# Patient Record
Sex: FEMALE | Race: WHITE | URBAN - METROPOLITAN AREA
[De-identification: names, ages, dates, MRNs, and addresses within clinical notes are randomized per-mention and may not be internally consistent; named-entity substitution may affect disease eponyms.]

---

## 2017-04-06 ENCOUNTER — OFFICE VISIT (OUTPATIENT)
Dept: FAMILY MEDICINE | Facility: CLINIC | Age: 22
End: 2017-04-06
Payer: COMMERCIAL

## 2017-04-06 VITALS
HEIGHT: 64 IN | WEIGHT: 189 LBS | HEART RATE: 100 BPM | BODY MASS INDEX: 32.27 KG/M2 | SYSTOLIC BLOOD PRESSURE: 112 MMHG | DIASTOLIC BLOOD PRESSURE: 64 MMHG | TEMPERATURE: 97.1 F | RESPIRATION RATE: 14 BRPM | OXYGEN SATURATION: 99 %

## 2017-04-06 DIAGNOSIS — F41.8 DEPRESSION WITH ANXIETY: Primary | ICD-10-CM

## 2017-04-06 PROCEDURE — 99213 OFFICE O/P EST LOW 20 MIN: CPT | Performed by: FAMILY MEDICINE

## 2017-04-06 RX ORDER — BUPROPION HYDROCHLORIDE 150 MG/1
150 TABLET ORAL EVERY MORNING
Qty: 30 TABLET | Refills: 1 | Status: SHIPPED | OUTPATIENT
Start: 2017-04-06 | End: 2018-05-25

## 2017-04-06 ASSESSMENT — ANXIETY QUESTIONNAIRES
2. NOT BEING ABLE TO STOP OR CONTROL WORRYING: SEVERAL DAYS
GAD7 TOTAL SCORE: 7
6. BECOMING EASILY ANNOYED OR IRRITABLE: NEARLY EVERY DAY
1. FEELING NERVOUS, ANXIOUS, OR ON EDGE: NOT AT ALL
IF YOU CHECKED OFF ANY PROBLEMS ON THIS QUESTIONNAIRE, HOW DIFFICULT HAVE THESE PROBLEMS MADE IT FOR YOU TO DO YOUR WORK, TAKE CARE OF THINGS AT HOME, OR GET ALONG WITH OTHER PEOPLE: SOMEWHAT DIFFICULT
5. BEING SO RESTLESS THAT IT IS HARD TO SIT STILL: SEVERAL DAYS
7. FEELING AFRAID AS IF SOMETHING AWFUL MIGHT HAPPEN: MORE THAN HALF THE DAYS
3. WORRYING TOO MUCH ABOUT DIFFERENT THINGS: NOT AT ALL

## 2017-04-06 ASSESSMENT — PAIN SCALES - GENERAL: PAINLEVEL: NO PAIN (0)

## 2017-04-06 ASSESSMENT — PATIENT HEALTH QUESTIONNAIRE - PHQ9: 5. POOR APPETITE OR OVEREATING: NOT AT ALL

## 2017-04-06 NOTE — NURSING NOTE
"Chief Complaint   Patient presents with     Recheck Medication     switch medication     Contraception     talk about it       Initial /64 (BP Location: Right arm, Patient Position: Chair, Cuff Size: Adult Regular)  Pulse 100  Temp 97.1  F (36.2  C) (Temporal)  Resp 14  Ht 5' 4\" (1.626 m)  Wt 189 lb (85.7 kg)  SpO2 99%  BMI 32.44 kg/m2 Estimated body mass index is 32.44 kg/(m^2) as calculated from the following:    Height as of this encounter: 5' 4\" (1.626 m).    Weight as of this encounter: 189 lb (85.7 kg).  Medication Reconciliation: complete   Ludivina Leigh MA 4/6/2017        "

## 2017-04-06 NOTE — PROGRESS NOTES
SUBJECTIVE:                                                    Roxi Michael is a 22 year old female who presents to clinic today for the following health issues:        Depression and Anxiety Follow-Up    Status since last visit: SAME     Other associated symptoms:None    Complicating factors:     Significant life event: No     Current substance abuse: None    PHQ-9 SCORE 10/3/2016 4/6/2017   Total Score 10 15     ASHLEY-7 SCORE 10/3/2016   Total Score 11        PHQ-9  English      PHQ-9   Any Language     GAD7       Amount of exercise or physical activity: None    Problems taking medications regularly: No    Medication side effects: none    Diet: regular (no restrictions)    Roxi is here today for follow up on depression and anxiety.  Has had these conditions for years.  Was doing well with Lexapro, but lately she hasn't felt it was working for her.  She took herself the for 3 weeks and has not felt the difference. Denies of depression or anxiety.  She noted having problem with concentration - not able to focus on any thinng. Get distracted easily. Trying to go back to school.  Never been diagnosed ADHD.  Stated that she barely passed the high school and college. She dropped out of college.  Used to snort cocaine, but has not done any drug for long time.  Also quit drinking about 2 weeks ago.  No suicidal. Used to feel anxious, but not much now.  Do not feel depressed.  No excessive stress.    Problem list and histories reviewed & adjusted, as indicated.  Additional history: as documented    Patient Active Problem List   Diagnosis     Illicit drug use     Depression with anxiety     Non morbid obesity due to excess calories     Past Surgical History:   Procedure Laterality Date     NO HISTORY OF SURGERY         Social History   Substance Use Topics     Smoking status: Former Smoker     Quit date: 1/5/2016     Smokeless tobacco: Never Used     Alcohol use 33.6 oz/week     56 Standard drinks or equivalent per  "week      Comment: daily - 7-8 drinks couple.       Family History   Problem Relation Age of Onset     DIABETES No family hx of      Coronary Artery Disease No family hx of      Hypertension No family hx of      Hyperlipidemia No family hx of      CEREBROVASCULAR DISEASE No family hx of      Breast Cancer No family hx of      Colon Cancer No family hx of      Prostate Cancer No family hx of      Other Cancer No family hx of          Current Outpatient Prescriptions   Medication Sig Dispense Refill     etonogestrel (IMPLANON/NEXPLANON) 68 MG IMPL 1 each (68 mg) by Subdermal route continuous  0     escitalopram (LEXAPRO) 20 MG tablet Take 1 tablet (20 mg) by mouth daily 90 tablet 1     Allergies   Allergen Reactions     No Known Drug Allergies      Nickel Itching and Rash       Reviewed and updated as needed this visit by clinical staff  Tobacco  Allergies  Meds  Med Hx  Surg Hx  Fam Hx  Soc Hx      Reviewed and updated as needed this visit by Provider         ROS:  Constitutional, HEENT, cardiovascular, pulmonary, gi and gu systems are negative, except as otherwise noted.    OBJECTIVE:                                                    /64 (BP Location: Right arm, Patient Position: Chair, Cuff Size: Adult Regular)  Pulse 100  Temp 97.1  F (36.2  C) (Temporal)  Resp 14  Ht 5' 4\" (1.626 m)  Wt 189 lb (85.7 kg)  SpO2 99%  BMI 32.44 kg/m2  Body mass index is 32.44 kg/(m^2).   GENERAL: healthy, alert and no distress  HENT: ear canals and TM's normal.  Nares are non-congested. No tender with palpation to the sinuses.  NECK: no adenopathy and thyroid normal to palpation  RESP: lungs clear to auscultation - no rales, rhonchi or wheezes  CV: regular rate and rhythm, no murmur.  NEURO: Normal strength and tone, mentation intact and speech normal.  Dress appropriately.  PSYCH: mentation appears normal, affect normal/bright. Thought is intact, no hallucination, suicidal or homicidal     Diagnostic Test " Results:  none      ASSESSMENT/PLAN:                                                        ICD-10-CM    1. Depression with anxiety F41.8 buPROPion (WELLBUTRIN XL) 150 MG 24 hr tablet     Roxi is known to have depression and anxiety and was doing well with Lexapro for years. She stopped the Lexapro 3 weeks ago because she didn't feel was affective. She is not having depression or anxiety despite being off the Lexapro for 3 weeks. She noted of having trouble with concentrating and get distracted easily. She has no history or being diagnosed with ADHD although she had trouble with passing the classes in high school and college years. She dropped out of college because of it. She is now trying to go back to school. She is concerned as he may have ADHD/ADD. She has a history of cocaine use but quit it for a while. She is also quit drinking about 2 weeks ago. She is trying to put her life together and wanted to go back to school. No active suicidal or homicidal ideation. No hallucination.     Discussed with her about treatment options. Refer her to psychologist to evaluate for possible ADD/ADHD.  In meantime will have her try Wellbutrin 150 mg daily. Side effects discussed. Follow-up in a month, earlier as needed. I also encouraged her to keep up the good work to stay away from drugs and alcohol.  I encouraged her to keep focus on her goal and work hard to achieve it.    Yogi Ferrell Mai, MD  [facility]

## 2017-04-06 NOTE — MR AVS SNAPSHOT
"              After Visit Summary   2017    Roxi Michael    MRN: [id number]           Patient Information     Date Of Birth          1995        Visit Information        Provider Department      2017 2:40 PM Yogi James MD [facility]        Today's Diagnoses     Depression with anxiety    -  1       Follow-ups after your visit        Follow-up notes from your care team     Return in about 1 month (around 2017).      Who to contact     If you have questions or need follow up information about today's clinic visit or your schedule please contact [facility] directly at [phone].  Normal or non-critical lab and imaging results will be communicated to you by Format [employer], letter or phone within 4 business days after the clinic has received the results. If you do not hear from us within 7 days, please contact the clinic through [employer] or phone. If you have a critical or abnormal lab result, we will notify you by phone as soon as possible.  Submit refill requests through [employer] or call your pharmacy and they will forward the refill request to us. Please allow 3 business days for your refill to be completed.          Additional Information About Your Visit        MyChart Information     [employer] lets you send messages to your doctor, view your test results, renew your prescriptions, schedule appointments and more. To sign up, go to [email or web address] . Click on \"Log in\" on the left side of the screen, which will take you to the Welcome page. Then click on \"Sign up Now\" on the right side of the page.     You will be asked to enter the access code listed below, as well as some personal information. Please follow the directions to create your username and password.     Your access code is: [email or web address]  Expires: 2017 10:14 PM     Your access code will  in 90 days. If you need help or a new code, please call your [facility] or [phone].        Care " "EveryWhere ID     This is your Care EveryWhere ID. This could be used by other organizations to access your [facility] medical records  [id number]        Your Vitals Were     Pulse Temperature Respirations Height Pulse Oximetry BMI (Body Mass Index)    100 97.1  F (36.2  C) (Temporal) 14 5' 4\" (1.626 m) 99% 32.44 kg/m2       Blood Pressure from Last 3 Encounters:   04/06/17 112/64   11/15/16 104/68   10/03/16 126/76    Weight from Last 3 Encounters:   04/06/17 189 lb (85.7 kg)   11/15/16 186 lb 8 oz (84.6 kg)   10/03/16 183 lb 6.4 oz (83.2 kg)              Today, you had the following     No orders found for display         Today's Medication Changes          These changes are accurate as of: 4/6/17 11:59 PM.  If you have any questions, ask your nurse or doctor.               Start taking these medicines.        Dose/Directions    buPROPion 150 MG 24 hr tablet   Commonly known as:  WELLBUTRIN XL   Used for:  Depression with anxiety   Started by:  Yogi James MD        Dose:  150 mg   Take 1 tablet (150 mg) by mouth every morning   Quantity:  30 tablet   Refills:  1            Where to get your medicines      These medications were sent to [facility] [place] [facility] [address]   [address] , [facility] [address]     Phone:  [phone]     buPROPion 150 MG 24 hr tablet                Primary Care Provider    None Specified       No primary provider on file.        Thank you!     Thank you for choosing [facility]  for your care. Our goal is always to provide you with excellent care. Hearing back from our patients is one way we can continue to improve our services. Please take a few minutes to complete the written survey that you may receive in the mail after your visit with us. Thank you!             Your Updated Medication List - Protect others around you: Learn how to safely use, store and throw away your medicines at www.disposemymeds.org.          This list is accurate " as of: 4/6/17 11:59 PM.  Always use your most recent med list.                   Brand Name Dispense Instructions for use    buPROPion 150 MG 24 hr tablet    WELLBUTRIN XL    30 tablet    Take 1 tablet (150 mg) by mouth every morning       etonogestrel 68 MG Impl    IMPLANON/NEXPLANON     1 each (68 mg) by Subdermal route continuous

## 2017-04-07 ASSESSMENT — ANXIETY QUESTIONNAIRES: GAD7 TOTAL SCORE: 7

## 2017-04-07 ASSESSMENT — PATIENT HEALTH QUESTIONNAIRE - PHQ9: SUM OF ALL RESPONSES TO PHQ QUESTIONS 1-9: 15

## 2017-04-11 ENCOUNTER — TELEPHONE (OUTPATIENT)
Dept: FAMILY MEDICINE | Facility: CLINIC | Age: 22
End: 2017-04-11

## 2017-04-11 DIAGNOSIS — Z13.39 ATTENTION DEFICIT HYPERACTIVITY DISORDER (ADHD) EVALUATION: Primary | ICD-10-CM

## 2017-04-11 DIAGNOSIS — F90.0 ADD (ATTENTION DEFICIT HYPERACTIVITY DISORDER, INATTENTIVE TYPE): ICD-10-CM

## 2017-04-11 NOTE — TELEPHONE ENCOUNTER
----- Message from Yogi Ferrell Mai, MD sent at 4/8/2017 10:13 PM CDT -----  Please refer to psychologist for ADHD/ADD evaluation. Notes completed. Thank you

## 2017-04-11 NOTE — TELEPHONE ENCOUNTER
Referral placed and faxed to [facility]. They will contact patient with scheduling. Raysa Graham LPN

## 2018-04-20 ENCOUNTER — OFFICE VISIT (OUTPATIENT)
Dept: URGENT CARE | Facility: RETAIL CLINIC | Age: 23
End: 2018-04-20
Payer: COMMERCIAL

## 2018-04-20 VITALS
SYSTOLIC BLOOD PRESSURE: 120 MMHG | DIASTOLIC BLOOD PRESSURE: 84 MMHG | OXYGEN SATURATION: 100 % | TEMPERATURE: 98.9 F | HEART RATE: 60 BPM

## 2018-04-20 DIAGNOSIS — R09.81 NASAL SINUS CONGESTION: ICD-10-CM

## 2018-04-20 DIAGNOSIS — J01.90 ACUTE SINUSITIS WITH SYMPTOMS > 10 DAYS: Primary | ICD-10-CM

## 2018-04-20 PROCEDURE — 99203 OFFICE O/P NEW LOW 30 MIN: CPT | Performed by: PHYSICIAN ASSISTANT

## 2018-04-20 RX ORDER — FLUTICASONE PROPIONATE 50 MCG
1-2 SPRAY, SUSPENSION (ML) NASAL DAILY
Qty: 1 BOTTLE | Refills: 0 | Status: SHIPPED | OUTPATIENT
Start: 2018-04-20 | End: 2018-05-25

## 2018-04-20 NOTE — NURSING NOTE
"Chief Complaint   Patient presents with     Sinus Problem     x 1 week states that she has had a cold previsously but that is now gone.  Sinus symptoms won't go away.       Initial /84  Pulse 60  Temp 98.9  F (37.2  C) (Temporal)  SpO2 100% Estimated body mass index is 32.44 kg/(m^2) as calculated from the following:    Height as of 4/6/17: 5' 4\" (1.626 m).    Weight as of 4/6/17: 189 lb (85.7 kg).  Medication Reconciliation: complete   Kimberley Mckeon CMA      "

## 2018-04-20 NOTE — PROGRESS NOTES
Chief Complaint   Patient presents with     Sinus Problem     x 1 week states that she has had a cold previsously but that is now gone.  Sinus symptoms won't go away.

## 2018-04-20 NOTE — PATIENT INSTRUCTIONS
Please FOLLOW UP at primary care clinic if not improving, new symptoms, worse or this does not resolve.  [facility]  [phone]

## 2018-04-20 NOTE — MR AVS SNAPSHOT
"              After Visit Summary   2018    Roxi Michael    MRN: [id number]           Patient Information     Date Of Birth          1995        Visit Information        Provider Department      2018 4:00 PM Karen Plaza PA-C [facility]        Today's Diagnoses     Nasal sinus congestion    -  1    Acute sinusitis with symptoms > 10 days          Care Instructions      Please FOLLOW UP at primary care clinic if not improving, new symptoms, worse or this does not resolve.  [facility]  [phone]            Follow-ups after your visit        Who to contact     You can reach your care team any time of the day by calling [phone].  Notification of test results:  If you have an abnormal lab result, we will notify you by phone as soon as possible.         Additional Information About Your Visit        MyChart Information     [employer]hart lets you send messages to your doctor, view your test results, renew your prescriptions, schedule appointments and more. To sign up, go to [email or web address] . Click on \"Log in\" on the left side of the screen, which will take you to the Welcome page. Then click on \"Sign up Now\" on the right side of the page.     You will be asked to enter the access code listed below, as well as some personal information. Please follow the directions to create your username and password.     Your access code is: [email or web address]  Expires: 2018  4:32 PM     Your access code will  in 90 days. If you need help or a new code, please call your [facility] or [phone].        Care EveryWhere ID     This is your Care EveryWhere ID. This could be used by other organizations to access your [facility] medical records  [id number]        Your Vitals Were     Pulse Temperature Pulse Oximetry             60 98.9  F (37.2  C) (Temporal) 100%          Blood Pressure from Last 3 Encounters:   18 120/84   17 112/64   11/15/16 104/68 "    Weight from Last 3 Encounters:   04/06/17 189 lb (85.7 kg)   11/15/16 186 lb 8 oz (84.6 kg)   10/03/16 183 lb 6.4 oz (83.2 kg)              Today, you had the following     No orders found for display         Today's Medication Changes          These changes are accurate as of 4/20/18  4:32 PM.  If you have any questions, ask your nurse or doctor.               Start taking these medicines.        Dose/Directions    amoxicillin-clavulanate 875-125 MG per tablet   Commonly known as:  AUGMENTIN   Used for:  Acute sinusitis with symptoms > 10 days   Started by:  Karen Plaza PA-C        Dose:  1 tablet   Take 1 tablet by mouth 2 times daily for 10 days   Quantity:  20 tablet   Refills:  0       fluticasone 50 MCG/ACT spray   Commonly known as:  FLONASE   Used for:  Nasal sinus congestion   Started by:  Karen Plaza PA-C        Dose:  1-2 spray   Spray 1-2 sprays into both nostrils daily   Quantity:  1 Bottle   Refills:  0            Where to get your medicines      These medications were sent [address]  [address] [facility] [address]     Phone:  [phone]     amoxicillin-clavulanate 875-125 MG per tablet    fluticasone 50 MCG/ACT spray                Primary Care Provider Fax #    Provider Not In System [phone]                Equal Access to Services     [facility] ALEE AH: Hadii sabrina lyons hadsalmao Sotrino, waaxda luqadaha, qaybta kaalmada ademadaida, jarad lugo. So [facility] [phone].    ATENCIÓN: Si habla español, tiene a fletcher disposición servicios gratuitos de asistencia lingüística. Heidi al [phone].    We comply with applicable federal civil rights laws and [place] laws. We do not discriminate on the basis of race, color, national origin, age, disability, sex, sexual orientation, or gender identity.            Thank you!     Thank you for choosing [facility]  for your care. Our goal is always to provide  you with excellent care. Hearing back from our patients is one way we can continue to improve our services. Please take a few minutes to complete the written survey that you may receive in the mail after your visit with us. Thank you!             Your Updated Medication List - Protect others around you: Learn how to safely use, store and throw away your medicines at www.disposemymeds.org.          This list is accurate as of 4/20/18  4:32 PM.  Always use your most recent med list.                   Brand Name Dispense Instructions for use Diagnosis    amoxicillin-clavulanate 875-125 MG per tablet    AUGMENTIN    20 tablet    Take 1 tablet by mouth 2 times daily for 10 days    Acute sinusitis with symptoms > 10 days       buPROPion 150 MG 24 hr tablet    WELLBUTRIN XL    30 tablet    Take 1 tablet (150 mg) by mouth every morning    Depression with anxiety       etonogestrel 68 MG Impl    IMPLANON/NEXPLANON     1 each (68 mg) by Subdermal route continuous    Insertion of Nexplanon       fluticasone 50 MCG/ACT spray    FLONASE    1 Bottle    Spray 1-2 sprays into both nostrils daily    Nasal sinus congestion

## 2018-04-20 NOTE — PROGRESS NOTES
Chief Complaint   Patient presents with     Sinus Problem     x 1 week states that she has had a cold previsously but that is now gone.  Sinus symptoms won't go away.         SUBJECTIVE:   Pt. presenting to [facility] -  with a chief complaint of head cold x weeks and now can't taste or smell and congestion >. Tired..   See CC..  Onset of symptoms gradual  Course of illness is worsening.    Severity moderate  Current and Associated symptoms: runny nose, stuffy nose, facial pain/pressure and headache  Treatment measures tried include netti rinse.  Predisposing factors include hx of sinusitis 'several times/year'.  Last antibiotic none x years      Pregnancy no  Smoker no    ROS:  Afebrile   Energy level is a little <  ENT - denies ear pain, throat pain.   CP - no cough,SOB or chest pain   GI- - appetite normal. No nausea, vomiting or diarrhea.   No bowel or bladder changes   MSK - no joint pain or swelling   Skin: No rashes    Past Medical History:   Diagnosis Date     Acute serous otitis media 02/24/94     Cancer (H)      Contact dermatitis and other eczema, due to unspecified cause 04/19/96     Depressive disorder      Rubella without mention of complication 02/24/94     Past Surgical History:   Procedure Laterality Date     NO HISTORY OF SURGERY       Patient Active Problem List   Diagnosis     Illicit drug use     Depression with anxiety     Non morbid obesity due to excess calories     Current Outpatient Prescriptions   Medication     buPROPion (WELLBUTRIN XL) 150 MG 24 hr tablet     etonogestrel (IMPLANON/NEXPLANON) 68 MG IMPL     No current facility-administered medications for this visit.        OBJECTIVE:  /84  Pulse 60  Temp 98.9  F (37.2  C) (Temporal)  SpO2 100%    GENERAL APPEARANCE: cooperative, alert and no distress. Appears well hydrated.  EYES: conjunctiva clear  HENT: Rt ear canal  clear and TM normal   Lt ear canal clear and TM normal   Nose marked congestion. thick  discharge  Mouth without ulcers or lesions. no erythema. no exudate. PND noted  NECK: supple, few small shoddy NT ant nodes. No  posterior nodes.  RESP: lungs clear to auscultation - no rales, rhonchi or wheezes. Breathing easily.  CV: regular rates and rhythm  ABDOMEN:  soft, nontender, no HSM or masses and bowel sounds normal   SKIN: no suspicious lesions or rashes  mod tenderness to palpate over carlos manuel max sinus areas.      ASSESSMENT:     Nasal sinus congestion  Acute sinusitis with symptoms > 10 days      PLAN:  Symptomatic measures   Prescriptions as below. Discussed indications, dosing, side affects and adverse reactions of medications with  Patient -Augmentin and Flonase  Eat yogurt daily or take a probiotic supplement when on antibiotics.  OTC cough suppressant/expectorant discussed  saline nasal spray or nasal flush for  nasal congestion   Cool mist vaporizer.   Stay in clean air environment.  > rest.  > fluids.  Contagiousness and hygiene discussed.  Fever and pain  control measures discussed.   If unable to swallow or any breathing difficulty to go to ED AVS given and discussed:  Patient Instructions     Please FOLLOW UP at primary care clinic if not improving, new symptoms, worse or this does not resolve.  [facility]  [phone]        Pt is comfortable with this plan.  Electronically signed,  BARBARA Boyd

## 2018-05-25 ENCOUNTER — OFFICE VISIT (OUTPATIENT)
Dept: FAMILY MEDICINE | Facility: CLINIC | Age: 23
End: 2018-05-25
Payer: COMMERCIAL

## 2018-05-25 VITALS
RESPIRATION RATE: 16 BRPM | TEMPERATURE: 97.3 F | SYSTOLIC BLOOD PRESSURE: 118 MMHG | DIASTOLIC BLOOD PRESSURE: 72 MMHG | HEART RATE: 94 BPM | BODY MASS INDEX: 28 KG/M2 | HEIGHT: 63 IN | OXYGEN SATURATION: 99 % | WEIGHT: 158 LBS

## 2018-05-25 DIAGNOSIS — F41.1 GAD (GENERALIZED ANXIETY DISORDER): Primary | ICD-10-CM

## 2018-05-25 DIAGNOSIS — E66.09 NON MORBID OBESITY DUE TO EXCESS CALORIES: ICD-10-CM

## 2018-05-25 DIAGNOSIS — F10.21 HISTORY OF ALCOHOLISM (H): ICD-10-CM

## 2018-05-25 DIAGNOSIS — F19.91 HISTORY OF DRUG USE: ICD-10-CM

## 2018-05-25 PROCEDURE — 99214 OFFICE O/P EST MOD 30 MIN: CPT | Performed by: FAMILY MEDICINE

## 2018-05-25 RX ORDER — HYDROXYZINE HYDROCHLORIDE 25 MG/1
25 TABLET, FILM COATED ORAL EVERY 8 HOURS PRN
Qty: 30 TABLET | Refills: 0 | Status: SHIPPED | OUTPATIENT
Start: 2018-05-25

## 2018-05-25 RX ORDER — ESCITALOPRAM OXALATE 10 MG/1
10 TABLET ORAL DAILY
Qty: 30 TABLET | Refills: 1 | Status: SHIPPED | OUTPATIENT
Start: 2018-05-25

## 2018-05-25 ASSESSMENT — ANXIETY QUESTIONNAIRES
IF YOU CHECKED OFF ANY PROBLEMS ON THIS QUESTIONNAIRE, HOW DIFFICULT HAVE THESE PROBLEMS MADE IT FOR YOU TO DO YOUR WORK, TAKE CARE OF THINGS AT HOME, OR GET ALONG WITH OTHER PEOPLE: VERY DIFFICULT
5. BEING SO RESTLESS THAT IT IS HARD TO SIT STILL: SEVERAL DAYS
6. BECOMING EASILY ANNOYED OR IRRITABLE: NEARLY EVERY DAY
GAD7 TOTAL SCORE: 17
3. WORRYING TOO MUCH ABOUT DIFFERENT THINGS: NEARLY EVERY DAY
7. FEELING AFRAID AS IF SOMETHING AWFUL MIGHT HAPPEN: MORE THAN HALF THE DAYS
1. FEELING NERVOUS, ANXIOUS, OR ON EDGE: NEARLY EVERY DAY
2. NOT BEING ABLE TO STOP OR CONTROL WORRYING: NEARLY EVERY DAY

## 2018-05-25 ASSESSMENT — PATIENT HEALTH QUESTIONNAIRE - PHQ9: 5. POOR APPETITE OR OVEREATING: MORE THAN HALF THE DAYS

## 2018-05-25 ASSESSMENT — PAIN SCALES - GENERAL: PAINLEVEL: NO PAIN (0)

## 2018-05-25 NOTE — MR AVS SNAPSHOT
"              After Visit Summary   5/25/2018    Roxi Michael    MRN: [id number]           Patient Information     Date Of Birth          1995        Visit Information        Provider Department      5/25/2018 8:00 AM Yogi James MD [facility]        Today's Diagnoses     AHSLEY (generalized anxiety disorder)    -  1    History of alcoholism (H)        History of drug use        Non morbid obesity due to excess calories           Follow-ups after your visit        Follow-up notes from your care team     Return in about 3 months (around 8/25/2018).      Who to contact     If you have questions or need follow up information about today's clinic visit or your schedule please contact [facility] directly at [phone].  Normal or non-critical lab and imaging results will be communicated to you by ReferMehart, letter or phone within 4 business days after the clinic has received the results. If you do not hear from us within 7 days, please contact the clinic through ReferMehart or phone. If you have a critical or abnormal lab result, we will notify you by phone as soon as possible.  Submit refill requests through [employer] or call your pharmacy and they will forward the refill request to us. Please allow 3 business days for your refill to be completed.          Additional Information About Your Visit        MyChart Information     [employer] lets you send messages to your doctor, view your test results, renew your prescriptions, schedule appointments and more. To sign up, go to [email or web address] . Click on \"Log in\" on the left side of the screen, which will take you to the Welcome page. Then click on \"Sign up Now\" on the right side of the page.     You will be asked to enter the access code listed below, as well as some personal information. Please follow the directions to create your username and password.     Your access code is: [email or web address]  Expires: 7/19/2018  4:32 PM     Your access " "code will  in 90 days. If you need help or a new code, please call your [place] clinic or [phone].        Care EveryWhere ID     This is your Care EveryWhere ID. This could be used by other organizations to access your [place] medical records  [id number]        Your Vitals Were     Pulse Temperature Respirations Height Last Period Pulse Oximetry    94 97.3  F (36.3  C) (Temporal) 16 5' 3\" (1.6 m) 2018 99%    Breastfeeding? BMI (Body Mass Index)                No 27.99 kg/m2           Blood Pressure from Last 3 Encounters:   18 118/72   18 120/84   17 112/64    Weight from Last 3 Encounters:   18 158 lb (71.7 kg)   17 189 lb (85.7 kg)   11/15/16 186 lb 8 oz (84.6 kg)              Today, you had the following     No orders found for display         Today's Medication Changes          These changes are accurate as of 18  9:03 AM.  If you have any questions, ask your nurse or doctor.               Start taking these medicines.        Dose/Directions    escitalopram 10 MG tablet   Commonly known as:  LEXAPRO   Used for:  ASHLEY (generalized anxiety disorder)   Started by:  Yogi James MD        Dose:  10 mg   Take 1 tablet (10 mg) by mouth daily   Quantity:  30 tablet   Refills:  1       hydrOXYzine 25 MG tablet   Commonly known as:  ATARAX   Used for:  ASHLEY (generalized anxiety disorder)   Started by:  Yogi James MD        Dose:  25 mg   Take 1 tablet (25 mg) by mouth every 8 hours as needed for itching   Quantity:  30 tablet   Refills:  0         Stop taking these medicines if you haven't already. Please contact your care team if you have questions.     buPROPion 150 MG 24 hr tablet   Commonly known as:  WELLBUTRIN XL   Stopped by:  Yogi James MD                Where to get your medicines      These medications were sent to [id number]  [place] - [address]  [address], [place] [address]    Hours:  " test fax sent successfully 1/20/04  kr Phone:  [phone]     escitalopram 10 MG tablet    hydrOXYzine 25 MG tablet                Primary Care Provider Fax #    Provider Not In System [phone]                Equal Access to Services     ALINE VICKERS : Hadbev sabrina lyons lisao Marry, waaxda luqadaha, qaybta kaalmada adedeborayada, jarad pena laMariuszerwin lugo. So [facility] [phone].    ATENCIÓN: Si habla español, tiene a fletcher disposición servicios gratuitos de asistencia lingüística. Llame al [phone].    We comply with applicable federal civil rights laws and [place] laws. We do not discriminate on the basis of race, color, national origin, age, disability, sex, sexual orientation, or gender identity.            Thank you!     Thank you for choosing [facility]  for your care. Our goal is always to provide you with excellent care. Hearing back from our patients is one way we can continue to improve our services. Please take a few minutes to complete the written survey that you may receive in the mail after your visit with us. Thank you!             Your Updated Medication List - Protect others around you: Learn how to safely use, store and throw away your medicines at www.disposemymeds.org.          This list is accurate as of 5/25/18  9:03 AM.  Always use your most recent med list.                   Brand Name Dispense Instructions for use Diagnosis    escitalopram 10 MG tablet    LEXAPRO    30 tablet    Take 1 tablet (10 mg) by mouth daily    ASHLEY (generalized anxiety disorder)       hydrOXYzine 25 MG tablet    ATARAX    30 tablet    Take 1 tablet (25 mg) by mouth every 8 hours as needed for itching    ASHLEY (generalized anxiety disorder)

## 2018-05-25 NOTE — PROGRESS NOTES
SUBJECTIVE:   Roxi Michael is a 23 year old female who presents to clinic today for the following health issues:    Depression and Anxiety Follow-Up    Status since last visit: Worsened     Other associated symptoms:None    Complicating factors:     Significant life event: Yes-  Working more      Current substance abuse: None    PHQ-9 10/3/2016 4/6/2017 5/25/2018   Total Score 10 15 13   Q9: Suicide Ideation Not at all Not at all Not at all     ASHLEY-7 SCORE 10/3/2016 4/6/2017 5/25/2018   Total Score 11 7 17       PHQ-9  English  PHQ-9   Any Language  ASHLEY-7  Suicide Assessment Five-step Evaluation and Treatment (SAFE-T)    Amount of exercise or physical activity: None    Problems taking medications regularly: No    Medication side effects: not taking any medications at this time    Diet: regular (no restrictions) and breakfast skipped occasionally      Roxi is here today for follow-up on her anxiety.  She does not feel she has depression.  She has anxiety problem for many years and was on Lexapro for about a year and it was working pretty well.  She took herself off the medication because she wanted to turn her life around and be off the medication.  At the last visit, she was started on Wellbutrin due to concern of ADHD.  She took it for about a month but then stopped because she tries to be off all the medications.  She has been off the medication for about a month.  Her anxiety has gotten worse since then.  Get anxious easily andworried excessively about every thing with racing thoughts.  Gets stressed out easily. She thinks her anxiety caused her having problem with focusing.  More stress in her life.  She works 2 jobs at this time and has been feeling more tire.  She would like something for anxiety.  Denies of depression.  No suicidal homicidal ideation.  No hallucination.  She uses drink alcohol heavily but has stopped; the last time she drank was about 3 weeks ago.  She also stopped using drug.  She is  very happy with her life change.  She been losing quite a bit away from working out and being busy as well as stay from alcohol and eating healthier diet.  She is happy about that as well.  She has no other concern today.  She is interested to be back on the medication for anxiety.  She does not want counseling.    Problem list and histories reviewed & adjusted, as indicated.  Additional history: as documented    Patient Active Problem List   Diagnosis     Non morbid obesity due to excess calories     ASHLEY (generalized anxiety disorder)     History of drug use     Past Surgical History:   Procedure Laterality Date     NO HISTORY OF SURGERY         Social History   Substance Use Topics     Smoking status: Former Smoker     Quit date: 1/5/2016     Smokeless tobacco: Never Used     Alcohol use 33.6 oz/week     56 Standard drinks or equivalent per week      Comment: 1-2 drinks a week     Family History   Problem Relation Age of Onset     DIABETES No family hx of      Coronary Artery Disease No family hx of      Hypertension No family hx of      Hyperlipidemia No family hx of      CEREBROVASCULAR DISEASE No family hx of      Breast Cancer No family hx of      Colon Cancer No family hx of      Prostate Cancer No family hx of      Other Cancer No family hx of          Current Outpatient Prescriptions   Medication Sig Dispense Refill     escitalopram (LEXAPRO) 10 MG tablet Take 1 tablet (10 mg) by mouth daily 30 tablet 1     hydrOXYzine (ATARAX) 25 MG tablet Take 1 tablet (25 mg) by mouth every 8 hours as needed for itching 30 tablet 0     Allergies   Allergen Reactions     No Known Drug Allergies      Nickel Itching and Rash       Reviewed and updated as needed this visit by clinical staff  Tobacco  Allergies  Meds  Soc Hx      Reviewed and updated as needed this visit by Provider         ROS:  Constitutional, HEENT, cardiovascular, pulmonary, gi and gu systems are negative, except as otherwise noted.    OBJECTIVE:  "    /72 (BP Location: Right arm, Patient Position: Sitting, Cuff Size: Adult Regular)  Pulse 94  Temp 97.3  F (36.3  C) (Temporal)  Resp 16  Ht 5' 3\" (1.6 m)  Wt 158 lb (71.7 kg)  LMP 05/18/2018  SpO2 99%  Breastfeeding? No  BMI 27.99 kg/m2  Body mass index is 27.99 kg/(m^2).   GENERAL: healthy, alert and no distress  HENT: ear canals and TM's normal.  Nares are non-congested. No tender with palpation to the sinuses.  NECK: no adenopathy and thyroid normal to palpation  RESP: lungs clear to auscultation - no rales, rhonchi or wheezes  CV: regular rate and rhythm, no murmur.  NEURO: Normal strength and tone, mentation intact and speech normal.  Dress appropriately.  PSYCH: mentation appears normal, affect normal/bright. Thought is intact, no hallucination, suicidal or homicidal       Diagnostic Test Results:  No results found for this or any previous visit (from the past 24 hour(s)).    ASSESSMENT/PLAN:       1. ASHLEY (generalized anxiety disorder)  She has this condition for many years and was doing pretty well with the Lexapro.  No depression.  She was on the Lexapro for over a year but then took herself off of it for a while.  There was no side effect from the Lexapro and it was effective.  She tried Wellbutrin for a month with no effect or side effect.  Now her anxiety has gotten worse significantly since stopping the Wellbutrin.  The last time she was on the medication was a month ago.  It is affecting her life. More stress in her life.  Again no depression; no suicidal/homicidal ideation.  No psychosis or hallucination.  I discussed with her about nature of the condition.  Does not want counseling.  Restarted the Lexapro ass he tolerated that in the past.  Will taper up slowly to as tolerated. Hydroxyzine as needed for anxiety attack; she uses rarely and it was effective.  Call in a month to update me on how she does with the Lexapro.  Follow-up in 3 months earlier as needed.  Symptoms that need to " be seen to call in also discussed.  Encouraged her to keep the good work with alcohol and drug use remission.    - escitalopram (LEXAPRO) 10 MG tablet; Take 1 tablet (10 mg) by mouth daily  Dispense: 30 tablet; Refill: 1  - hydrOXYzine (ATARAX) 25 MG tablet; Take 1 tablet (25 mg) by mouth every 8 hours as needed for itching  Dispense: 30 tablet; Refill: 0    2. History of alcoholism (H)  She has stopped drinking completely in the last several weeks and is doing well.  No craving or withdrawal symptoms.  Discussed about lab work but she declined.    3. History of drug use  Has been on remission for a while.  She has no concern and determined to stay in remission; has no problem with keeping it up.  Again no craving or withdrawal symptoms.    4. Non morbid obesity due to excess calories  Since lost of bowel 15 pounds since last visit.  It was intentional from lifestyle modification.  Her BMI today is 28.  Encouraged her to keep the good work.  Will continue to monitor.  Emphasized on healthy diet and daily exercise.        Yogi Ferrell Mai, MD  [facility]

## 2018-05-25 NOTE — NURSING NOTE
Chief Complaint   Patient presents with     Depression     Anxiety     Health Maintenance Due   Topic Date Due     HPV IMMUNIZATION (1 of 3 - Female 3 Dose Series) 03/09/2004     HIV SCREEN (SYSTEM ASSIGNED)  03/09/2011     Linnea Howard, CMA

## 2018-05-26 ASSESSMENT — PATIENT HEALTH QUESTIONNAIRE - PHQ9: SUM OF ALL RESPONSES TO PHQ QUESTIONS 1-9: 13

## 2018-05-26 ASSESSMENT — ANXIETY QUESTIONNAIRES: GAD7 TOTAL SCORE: 17

## 2018-07-23 ENCOUNTER — PRENATAL OFFICE VISIT (OUTPATIENT)
Dept: FAMILY MEDICINE | Facility: CLINIC | Age: 23
End: 2018-07-23

## 2018-07-23 ENCOUNTER — TELEPHONE (OUTPATIENT)
Dept: FAMILY MEDICINE | Facility: CLINIC | Age: 23
End: 2018-07-23

## 2018-07-23 ENCOUNTER — ALLIED HEALTH/NURSE VISIT (OUTPATIENT)
Dept: FAMILY MEDICINE | Facility: CLINIC | Age: 23
End: 2018-07-23
Payer: COMMERCIAL

## 2018-07-23 VITALS — BODY MASS INDEX: 29.51 KG/M2 | WEIGHT: 166.6 LBS

## 2018-07-23 DIAGNOSIS — Z53.9 ERRONEOUS ENCOUNTER--DISREGARD: Primary | ICD-10-CM

## 2018-07-23 DIAGNOSIS — N91.2 ABSENCE OF MENSTRUATION: Primary | ICD-10-CM

## 2018-07-23 LAB — HCG UR QL: POSITIVE

## 2018-07-23 PROCEDURE — 81025 URINE PREGNANCY TEST: CPT | Performed by: FAMILY MEDICINE

## 2018-07-23 PROCEDURE — 99207 ZZC NO CHARGE NURSE ONLY: CPT

## 2018-07-23 NOTE — PROGRESS NOTES
Roxi Michael is a 23 year old here today for a pregnancy test.  LMP: Patient's last menstrual period was 06/11/2018 (approximate).  Wt: 166 lbs 9.6 oz.    Symptoms include absence of menses.    Roxi informed of positive pregnancy test results. TAMELA: 3/18/2019    Educational advice given: Not appropriate.    Current medications reviewed: Yes    Previous pregnancy history remarkable for: N/A.    Plan: OB Education packet given and requesting information re: termination of pregnancy. Provided [facility] phone number to patient.    She is to call back if she has any questions or concerns.  She is advised to notify a provider immediately if she experiences any severe cramping or abdominal pain or any vaginal bleeding.    Forwarding to Andreina Angel OB Educator as ORA.    Ivis Reese RN

## 2018-07-23 NOTE — MR AVS SNAPSHOT
"              After Visit Summary   2018    Roxi Michael    MRN: [id number]           Patient Information     Date Of Birth          1995        Visit Information        Provider Department      2018 1:44 PM Yogi James MD [facility]        Today's Diagnoses     ERRONEOUS ENCOUNTER--DISREGARD    -  1       Follow-ups after your visit        Who to contact     If you have questions or need follow up information about today's clinic visit or your schedule please contact [facility] directly at [phone].  Normal or non-critical lab and imaging results will be communicated to you by App in the Airhart, letter or phone within 4 business days after the clinic has received the results. If you do not hear from us within 7 days, please contact the clinic through App in the Airhart or phone. If you have a critical or abnormal lab result, we will notify you by phone as soon as possible.  Submit refill requests through [employer] or call your pharmacy and they will forward the refill request to us. Please allow 3 business days for your refill to be completed.          Additional Information About Your Visit        MyChart Information     [employer] lets you send messages to your doctor, view your test results, renew your prescriptions, schedule appointments and more. To sign up, go to [email or web address] . Click on \"Log in\" on the left side of the screen, which will take you to the Welcome page. Then click on \"Sign up Now\" on the right side of the page.     You will be asked to enter the access code listed below, as well as some personal information. Please follow the directions to create your username and password.     Your access code is: [email or web address]  Expires: 10/21/2018  1:46 PM     Your access code will  in 90 days. If you need help or a new code, please call your [facility] or [phone].        Care EveryWhere ID     This is your Care EveryWhere ID. This could be used by other " organizations to access your [place] medical records  [id number]         Blood Pressure from Last 3 Encounters:   05/25/18 118/72   04/20/18 120/84   04/06/17 112/64    Weight from Last 3 Encounters:   07/23/18 166 lb 9.6 oz (75.6 kg)   05/25/18 158 lb (71.7 kg)   04/06/17 189 lb (85.7 kg)              Today, you had the following     No orders found for display       Primary Care Provider Fax #    Physician No Ref-Primary [phone]       No address on file        Equal Access to Services     [facility] : Hadbev Tuttle, wakaryna odom, pat kaalmalexy anderson, jarad sauceda . So [facility] [phone].    ATENCIÓN: Si habla español, tiene a fletcher disposición servicios gratuitos de asistencia lingüística. Llame al [phone].    We comply with applicable federal civil rights laws and [place] laws. We do not discriminate on the basis of race, color, national origin, age, disability, sex, sexual orientation, or gender identity.            Thank you!     Thank you for choosing [facility]  for your care. Our goal is always to provide you with excellent care. Hearing back from our patients is one way we can continue to improve our services. Please take a few minutes to complete the written survey that you may receive in the mail after your visit with us. Thank you!             Your Updated Medication List - Protect others around you: Learn how to safely use, store and throw away your medicines at www.disposemymeds.org.          This list is accurate as of 7/23/18 11:59 PM.  Always use your most recent med list.                   Brand Name Dispense Instructions for use Diagnosis    escitalopram 10 MG tablet    LEXAPRO    30 tablet    Take 1 tablet (10 mg) by mouth daily    ASHLEY (generalized anxiety disorder)       hydrOXYzine 25 MG tablet    ATARAX    30 tablet    Take 1 tablet (25 mg) by mouth every 8 hours as needed for itching    ASHLEY (generalized anxiety  disorder)

## 2018-07-23 NOTE — TELEPHONE ENCOUNTER
Patient presented to the clinic today for pregnancy confirmation. Pregnancy was confirmed. Patient voiced that she is planning to terminate the pregnancy and would like information regarding this process. I gave the patient information for [facility]. Routing to OB Educator in case of needed follow up.     Ivis Reese RN

## 2018-07-23 NOTE — MR AVS SNAPSHOT
"              After Visit Summary   2018    Roxi Michael    MRN: [id number]           Patient Information     Date Of Birth          1995        Visit Information        Provider Department      2018 1:00 PM PH NURSE [facility]        Today's Diagnoses     Absence of menstruation    -  1       Follow-ups after your visit        Who to contact     If you have questions or need follow up information about today's clinic visit or your schedule please contact [facility] directly at [phone].  Normal or non-critical lab and imaging results will be communicated to you by SocialC[employer], letter or phone within 4 business days after the clinic has received the results. If you do not hear from us within 7 days, please contact the clinic through [employer] or phone. If you have a critical or abnormal lab result, we will notify you by phone as soon as possible.  Submit refill requests through [employer] or call your pharmacy and they will forward the refill request to us. Please allow 3 business days for your refill to be completed.          Additional Information About Your Visit        MyChart Information     [employer] lets you send messages to your doctor, view your test results, renew your prescriptions, schedule appointments and more. To sign up, go to [email or web address] . Click on \"Log in\" on the left side of the screen, which will take you to the Welcome page. Then click on \"Sign up Now\" on the right side of the page.     You will be asked to enter the access code listed below, as well as some personal information. Please follow the directions to create your username and password.     Your access code is: [email or web address]  Expires: 10/21/2018  1:46 PM     Your access code will  in 90 days. If you need help or a new code, please call your [facility] or [phone].        Care EveryWhere ID     This is your Care EveryWhere ID. This could be used by other organizations to " access your [place] medical records  [id number]        Your Vitals Were     Last Period BMI (Body Mass Index)                06/11/2018 (Approximate) 29.51 kg/m2           Blood Pressure from Last 3 Encounters:   05/25/18 118/72   04/20/18 120/84   04/06/17 112/64    Weight from Last 3 Encounters:   07/23/18 166 lb 9.6 oz (75.6 kg)   05/25/18 158 lb (71.7 kg)   04/06/17 189 lb (85.7 kg)              We Performed the Following     HCG qualitative urine        Primary Care Provider Fax #    Physician No Ref-Primary [phone]       No address on file        Equal Access to Services     VIKTOR VICKERS : Neri Tuttle, fernando odom, pat anderson, jarad sauceda . So [facility] [phone].    ATENCIÓN: Si habla español, tiene a fletcher disposición servicios gratuitos de asistencia lingüística. Llame al [phone].    We comply with applicable federal civil rights laws and [place] laws. We do not discriminate on the basis of race, color, national origin, age, disability, sex, sexual orientation, or gender identity.            Thank you!     Thank you for choosing [facility]  for your care. Our goal is always to provide you with excellent care. Hearing back from our patients is one way we can continue to improve our services. Please take a few minutes to complete the written survey that you may receive in the mail after your visit with us. Thank you!             Your Updated Medication List - Protect others around you: Learn how to safely use, store and throw away your medicines at www.disposemymeds.org.          This list is accurate as of 7/23/18  1:46 PM.  Always use your most recent med list.                   Brand Name Dispense Instructions for use Diagnosis    escitalopram 10 MG tablet    LEXAPRO    30 tablet    Take 1 tablet (10 mg) by mouth daily    ASHLEY (generalized anxiety disorder)       hydrOXYzine 25 MG tablet    ATARAX    30 tablet     Take 1 tablet (25 mg) by mouth every 8 hours as needed for itching    ASHLEY (generalized anxiety disorder)